# Patient Record
Sex: FEMALE | Race: WHITE | NOT HISPANIC OR LATINO
[De-identification: names, ages, dates, MRNs, and addresses within clinical notes are randomized per-mention and may not be internally consistent; named-entity substitution may affect disease eponyms.]

---

## 2018-01-31 ENCOUNTER — APPOINTMENT (OUTPATIENT)
Dept: PULMONOLOGY | Facility: CLINIC | Age: 38
End: 2018-01-31
Payer: COMMERCIAL

## 2018-01-31 VITALS
SYSTOLIC BLOOD PRESSURE: 120 MMHG | HEART RATE: 113 BPM | WEIGHT: 129 LBS | OXYGEN SATURATION: 98 % | BODY MASS INDEX: 19.55 KG/M2 | RESPIRATION RATE: 16 BRPM | DIASTOLIC BLOOD PRESSURE: 80 MMHG | HEIGHT: 68 IN

## 2018-01-31 DIAGNOSIS — Z80.42 FAMILY HISTORY OF MALIGNANT NEOPLASM OF PROSTATE: ICD-10-CM

## 2018-01-31 DIAGNOSIS — Z78.9 OTHER SPECIFIED HEALTH STATUS: ICD-10-CM

## 2018-01-31 DIAGNOSIS — J32.9 CHRONIC SINUSITIS, UNSPECIFIED: ICD-10-CM

## 2018-01-31 PROCEDURE — 71046 X-RAY EXAM CHEST 2 VIEWS: CPT

## 2018-01-31 PROCEDURE — 94729 DIFFUSING CAPACITY: CPT

## 2018-01-31 PROCEDURE — 99205 OFFICE O/P NEW HI 60 MIN: CPT | Mod: 25

## 2018-01-31 PROCEDURE — 94727 GAS DIL/WSHOT DETER LNG VOL: CPT

## 2018-01-31 PROCEDURE — 94060 EVALUATION OF WHEEZING: CPT

## 2018-01-31 RX ORDER — ALBUTEROL SULFATE 90 UG/1
108 (90 BASE) AEROSOL, METERED RESPIRATORY (INHALATION)
Qty: 8 | Refills: 0 | Status: ACTIVE | COMMUNITY
Start: 2018-01-08

## 2018-01-31 RX ORDER — TINIDAZOLE 500 MG/1
500 TABLET, FILM COATED ORAL
Qty: 8 | Refills: 0 | Status: ACTIVE | COMMUNITY
Start: 2018-01-18

## 2018-01-31 RX ORDER — FLUTICASONE PROPIONATE 50 UG/1
50 SPRAY, METERED NASAL
Qty: 16 | Refills: 0 | Status: ACTIVE | COMMUNITY
Start: 2018-01-02

## 2018-01-31 RX ORDER — BENZONATATE 100 MG/1
100 CAPSULE ORAL
Qty: 21 | Refills: 0 | Status: ACTIVE | COMMUNITY
Start: 2018-01-02

## 2018-01-31 RX ORDER — TERCONAZOLE 8 MG/G
0.8 CREAM VAGINAL
Qty: 20 | Refills: 0 | Status: ACTIVE | COMMUNITY
Start: 2018-01-18

## 2018-01-31 RX ORDER — LEVALBUTEROL HYDROCHLORIDE 0.63 MG/3ML
0.63 SOLUTION RESPIRATORY (INHALATION)
Qty: 120 | Refills: 3 | Status: ACTIVE | COMMUNITY
Start: 2018-01-31 | End: 1900-01-01

## 2018-02-01 ENCOUNTER — LABORATORY RESULT (OUTPATIENT)
Age: 38
End: 2018-02-01

## 2018-02-02 LAB
24R-OH-CALCIDIOL SERPL-MCNC: 72.1 PG/ML
25(OH)D3 SERPL-MCNC: 20 NG/ML
BASOPHILS # BLD AUTO: 0.01 K/UL
BASOPHILS NFR BLD AUTO: 0.1 %
DEPRECATED KAPPA LC FREE/LAMBDA SER: 0.66 RATIO
EOSINOPHIL # BLD AUTO: 0.02 K/UL
EOSINOPHIL NFR BLD AUTO: 0.2 %
HCT VFR BLD CALC: 40.4 %
HGB BLD-MCNC: 13.1 G/DL
IGA SER QL IEP: 201 MG/DL
IGE SER-MCNC: 35 IU/ML
IGG SER QL IEP: 810 MG/DL
IGM SER QL IEP: 84 MG/DL
IMM GRANULOCYTES NFR BLD AUTO: 0.3 %
KAPPA LC CSF-MCNC: 0.94 MG/DL
KAPPA LC SERPL-MCNC: 0.62 MG/DL
LYMPHOCYTES # BLD AUTO: 1.23 K/UL
LYMPHOCYTES NFR BLD AUTO: 14.1 %
MAN DIFF?: NORMAL
MCHC RBC-ENTMCNC: 29.3 PG
MCHC RBC-ENTMCNC: 32.4 GM/DL
MCV RBC AUTO: 90.4 FL
MONOCYTES # BLD AUTO: 0.69 K/UL
MONOCYTES NFR BLD AUTO: 7.9 %
NEUTROPHILS # BLD AUTO: 6.75 K/UL
NEUTROPHILS NFR BLD AUTO: 77.4 %
PLATELET # BLD AUTO: 179 K/UL
RBC # BLD: 4.47 M/UL
RBC # FLD: 12.9 %
WBC # FLD AUTO: 8.73 K/UL

## 2018-02-03 ENCOUNTER — TRANSCRIPTION ENCOUNTER (OUTPATIENT)
Age: 38
End: 2018-02-03

## 2018-02-03 LAB
A ALTERNATA IGE QN: <0.1 KUA/L
A FUMIGATUS IGE QN: <0.1 KUA/L
C ALBICANS IGE QN: <0.1 KUA/L
C HERBARUM IGE QN: <0.1 KUA/L
CAT DANDER IGE QN: <0.1 KUA/L
CLAM IGE QN: <0.1 KUA/L
CODFISH IGE QN: <0.1 KUA/L
COMMON RAGWEED IGE QN: <0.1 KUA/L
CORN IGE QN: <0.1 KUA/L
COW MILK IGE QN: <0.1 KUA/L
D FARINAE IGE QN: 9.67 KUA/L
D PTERONYSS IGE QN: 6.4 KUA/L
DEPRECATED A ALTERNATA IGE RAST QL: 0
DEPRECATED A FUMIGATUS IGE RAST QL: 0
DEPRECATED C ALBICANS IGE RAST QL: 0
DEPRECATED C HERBARUM IGE RAST QL: 0
DEPRECATED CAT DANDER IGE RAST QL: 0
DEPRECATED CLAM IGE RAST QL: 0
DEPRECATED CODFISH IGE RAST QL: 0
DEPRECATED COMMON RAGWEED IGE RAST QL: 0
DEPRECATED CORN IGE RAST QL: 0
DEPRECATED COW MILK IGE RAST QL: 0
DEPRECATED D FARINAE IGE RAST QL: ABNORMAL
DEPRECATED D PTERONYSS IGE RAST QL: ABNORMAL
DEPRECATED DOG DANDER IGE RAST QL: 0
DEPRECATED EGG WHITE IGE RAST QL: 0
DEPRECATED M RACEMOSUS IGE RAST QL: 0
DEPRECATED PEANUT IGE RAST QL: 0
DEPRECATED ROACH IGE RAST QL: 0
DEPRECATED SCALLOP IGE RAST QL: <0.1 KUA/L
DEPRECATED SESAME SEED IGE RAST QL: 0
DEPRECATED SHRIMP IGE RAST QL: 0
DEPRECATED SOYBEAN IGE RAST QL: 0
DEPRECATED TIMOTHY IGE RAST QL: 0
DEPRECATED WALNUT IGE RAST QL: 0
DEPRECATED WHEAT IGE RAST QL: 0
DEPRECATED WHITE OAK IGE RAST QL: 0
DOG DANDER IGE QN: <0.1 KUA/L
EGG WHITE IGE QN: <0.1 KUA/L
M RACEMOSUS IGE QN: <0.1 KUA/L
PEANUT IGE QN: <0.1 KUA/L
ROACH IGE QN: <0.1 KUA/L
SCALLOP IGE QN: 0
SCALLOP IGE QN: <0.1 KUA/L
SESAME SEED IGE QN: <0.1 KUA/L
SOYBEAN IGE QN: <0.1 KUA/L
TIMOTHY IGE QN: <0.1 KUA/L
WALNUT IGE QN: <0.1 KUA/L
WHEAT IGE QN: <0.1 KUA/L
WHITE OAK IGE QN: <0.1 KUA/L

## 2018-02-06 LAB
B PERT IGG SER-ACNC: <0.95 INDEX
B PERT IGM SER-ACNC: <1 INDEX
CHLAMYDIA PNEUMONIAE AB IGA: NORMAL TITER
CHLAMYDIA PNEUMONIAE AB IGG: NORMAL TITER
CHLAMYDIA PNEUMONIAE AB IGM: NORMAL TITER
DEPRECATED S PNEUM 1 IGG SER-MCNC: 4.7 MCG/ML
DEPRECATED S PNEUM12 AB SER-ACNC: 0.2 MCG/ML
DEPRECATED S PNEUM14 AB SER-ACNC: 2.4 MCG/ML
DEPRECATED S PNEUM17 IGG SER IA-MCNC: 4.3 MCG/ML
DEPRECATED S PNEUM18 IGG SER IA-MCNC: 0.6 MCG/ML
DEPRECATED S PNEUM19 IGG SER-MCNC: 1.7 MCG/ML
DEPRECATED S PNEUM19 IGG SER-MCNC: 7 MCG/ML
DEPRECATED S PNEUM2 IGG SER-MCNC: 1.1 MCG/ML
DEPRECATED S PNEUM20 IGG SER-MCNC: 1.3 MCG/ML
DEPRECATED S PNEUM22 IGG SER-MCNC: 17.6 MCG/ML
DEPRECATED S PNEUM23 AB SER-ACNC: 20 MCG/ML
DEPRECATED S PNEUM3 AB SER-ACNC: 1 MCG/ML
DEPRECATED S PNEUM34 IGG SER-MCNC: 2.2 MCG/ML
DEPRECATED S PNEUM4 AB SER-ACNC: 0.3 MCG/ML
DEPRECATED S PNEUM5 IGG SER-MCNC: 1.1 MCG/ML
DEPRECATED S PNEUM6 IGG SER-MCNC: 1.8 MCG/ML
DEPRECATED S PNEUM7 IGG SER-ACNC: 4.4 MCG/ML
DEPRECATED S PNEUM8 AB SER-ACNC: 0.6 MCG/ML
DEPRECATED S PNEUM9 AB SER-ACNC: 1.6 MCG/ML
DEPRECATED S PNEUM9 IGG SER-MCNC: 1.4 MCG/ML
IGG SUBSET TOTAL IGG: 783 MG/DL
IGG1 SER-MCNC: 384 MG/DL
IGG2 SER-MCNC: 305 MG/DL
IGG3 SER-MCNC: 24 MG/DL
IGG4 SER-MCNC: 18 MG/DL
M PNEUMO IGM SER QL IA: 186 UNITS/ML
MYCOPLASMA AG SPEC QL: NEGATIVE
STREPTOCOCCUS PNEUMONIAE SEROTYPE 11A: 0.7 MCG/ML
STREPTOCOCCUS PNEUMONIAE SEROTYPE 15B: 4 MCG/ML
STREPTOCOCCUS PNEUMONIAE SEROTYPE 33F: 0.9 MCG/ML

## 2018-02-06 RX ORDER — ERGOCALCIFEROL 1.25 MG/1
1.25 MG CAPSULE, LIQUID FILLED ORAL
Qty: 6 | Refills: 1 | Status: ACTIVE | COMMUNITY
Start: 2018-02-06 | End: 1900-01-01

## 2018-02-07 ENCOUNTER — FORM ENCOUNTER (OUTPATIENT)
Age: 38
End: 2018-02-07

## 2018-02-07 LAB
M PNEUMO IGG SER IA-ACNC: POSITIVE
M PNEUMO IGG SER QL IA: 1.11 INDEX

## 2018-02-08 ENCOUNTER — APPOINTMENT (OUTPATIENT)
Dept: CT IMAGING | Facility: CLINIC | Age: 38
End: 2018-02-08
Payer: COMMERCIAL

## 2018-02-08 ENCOUNTER — OUTPATIENT (OUTPATIENT)
Dept: OUTPATIENT SERVICES | Facility: HOSPITAL | Age: 38
LOS: 1 days | End: 2018-02-08
Payer: COMMERCIAL

## 2018-02-08 DIAGNOSIS — J39.8 OTHER SPECIFIED DISEASES OF UPPER RESPIRATORY TRACT: ICD-10-CM

## 2018-02-08 PROCEDURE — 71250 CT THORAX DX C-: CPT | Mod: 26

## 2018-02-08 PROCEDURE — 71250 CT THORAX DX C-: CPT

## 2018-02-13 RX ORDER — METHYLPREDNISOLONE 4 MG/1
4 TABLET ORAL
Qty: 21 | Refills: 0 | Status: DISCONTINUED | COMMUNITY
Start: 2018-01-08 | End: 2018-02-13

## 2018-02-13 RX ORDER — ERGOCALCIFEROL 1.25 MG/1
1.25 MG CAPSULE, LIQUID FILLED ORAL
Qty: 6 | Refills: 1 | Status: ACTIVE | COMMUNITY
Start: 2018-02-13 | End: 1900-01-01

## 2018-02-13 RX ORDER — CICLESONIDE 37 UG/1
37 AEROSOL, METERED NASAL TWICE DAILY
Qty: 3 | Refills: 1 | Status: ACTIVE | COMMUNITY
Start: 2018-02-13 | End: 1900-01-01

## 2018-02-13 RX ORDER — DESLORATADINE 5 MG/1
5 TABLET ORAL DAILY
Qty: 90 | Refills: 1 | Status: ACTIVE | COMMUNITY
Start: 2018-02-13 | End: 1900-01-01

## 2018-03-20 ENCOUNTER — APPOINTMENT (OUTPATIENT)
Dept: PULMONOLOGY | Facility: CLINIC | Age: 38
End: 2018-03-20

## 2018-03-23 ENCOUNTER — APPOINTMENT (OUTPATIENT)
Dept: PULMONOLOGY | Facility: CLINIC | Age: 38
End: 2018-03-23
Payer: COMMERCIAL

## 2018-03-23 VITALS
HEIGHT: 68 IN | HEART RATE: 82 BPM | DIASTOLIC BLOOD PRESSURE: 84 MMHG | WEIGHT: 129 LBS | OXYGEN SATURATION: 99 % | SYSTOLIC BLOOD PRESSURE: 108 MMHG | RESPIRATION RATE: 17 BRPM | BODY MASS INDEX: 19.55 KG/M2

## 2018-03-23 DIAGNOSIS — J45.909 UNSPECIFIED ASTHMA, UNCOMPLICATED: ICD-10-CM

## 2018-03-23 PROCEDURE — 99214 OFFICE O/P EST MOD 30 MIN: CPT | Mod: 25

## 2018-03-23 PROCEDURE — 94010 BREATHING CAPACITY TEST: CPT

## 2018-03-23 RX ORDER — FLUCONAZOLE 150 MG/1
150 TABLET ORAL
Qty: 1 | Refills: 0 | Status: DISCONTINUED | COMMUNITY
Start: 2018-02-20

## 2018-03-23 RX ORDER — OLOPATADINE HYDROCHLORIDE 665 UG/1
0.6 SPRAY, METERED NASAL
Qty: 3 | Refills: 1 | Status: DISCONTINUED | COMMUNITY
Start: 2018-01-31 | End: 2018-03-23

## 2018-03-23 RX ORDER — HYDROCODONE BITARTRATE AND HOMATROPINE METHYLBROMIDE 5; 1.5 MG/5ML; MG/5ML
5-1.5 SYRUP ORAL
Qty: 120 | Refills: 0 | Status: DISCONTINUED | COMMUNITY
Start: 2018-01-08 | End: 2018-03-23

## 2018-03-23 RX ORDER — TERCONAZOLE 4 MG/G
0.4 CREAM VAGINAL
Qty: 45 | Refills: 0 | Status: DISCONTINUED | COMMUNITY
Start: 2018-02-22

## 2018-03-23 RX ORDER — LEVOFLOXACIN 500 MG/1
500 TABLET, FILM COATED ORAL
Qty: 10 | Refills: 0 | Status: DISCONTINUED | COMMUNITY
Start: 2018-01-02 | End: 2018-03-23

## 2018-03-23 RX ORDER — RANITIDINE 300 MG/1
300 TABLET ORAL
Qty: 90 | Refills: 1 | Status: ACTIVE | COMMUNITY
Start: 2018-03-23 | End: 1900-01-01

## 2018-03-23 RX ORDER — PREDNISONE 10 MG/1
10 TABLET ORAL
Qty: 100 | Refills: 0 | Status: DISCONTINUED | COMMUNITY
Start: 2018-01-31 | End: 2018-03-23

## 2018-03-23 RX ORDER — AZITHROMYCIN 500 MG/1
500 TABLET, FILM COATED ORAL DAILY
Qty: 7 | Refills: 0 | Status: DISCONTINUED | COMMUNITY
Start: 2018-01-31 | End: 2018-03-23

## 2018-04-11 ENCOUNTER — RESULT REVIEW (OUTPATIENT)
Age: 38
End: 2018-04-11

## 2018-05-14 ENCOUNTER — MEDICATION RENEWAL (OUTPATIENT)
Age: 38
End: 2018-05-14

## 2018-06-06 ENCOUNTER — MEDICATION RENEWAL (OUTPATIENT)
Age: 38
End: 2018-06-06

## 2018-06-22 ENCOUNTER — APPOINTMENT (OUTPATIENT)
Dept: PULMONOLOGY | Facility: CLINIC | Age: 38
End: 2018-06-22
Payer: COMMERCIAL

## 2018-06-22 VITALS
DIASTOLIC BLOOD PRESSURE: 60 MMHG | RESPIRATION RATE: 14 BRPM | HEIGHT: 67 IN | HEART RATE: 88 BPM | SYSTOLIC BLOOD PRESSURE: 110 MMHG | OXYGEN SATURATION: 99 % | BODY MASS INDEX: 22.13 KG/M2 | WEIGHT: 141 LBS

## 2018-06-22 PROCEDURE — 90670 PCV13 VACCINE IM: CPT

## 2018-06-22 PROCEDURE — 94010 BREATHING CAPACITY TEST: CPT

## 2018-06-22 PROCEDURE — 99214 OFFICE O/P EST MOD 30 MIN: CPT | Mod: 25

## 2018-06-22 PROCEDURE — G0009: CPT

## 2018-06-22 RX ORDER — AZELASTINE HYDROCHLORIDE AND FLUTICASONE PROPIONATE 137; 50 UG/1; UG/1
137-50 SPRAY, METERED NASAL
Qty: 23 | Refills: 0 | Status: ACTIVE | COMMUNITY
Start: 2018-03-30

## 2018-06-22 RX ORDER — CLINDAMYCIN PHOSPHATE 20 MG/G
2 CREAM VAGINAL
Qty: 40 | Refills: 0 | Status: DISCONTINUED | COMMUNITY
Start: 2018-04-16

## 2018-06-22 RX ORDER — PYRIDOXINE HCL (VITAMIN B6) 25 MG
1.25 MG LOZENGE ON A HANDLE MUCOUS MEMBRANE
Qty: 4 | Refills: 0 | Status: ACTIVE | COMMUNITY
Start: 2018-04-06

## 2018-06-25 ENCOUNTER — MEDICATION RENEWAL (OUTPATIENT)
Age: 38
End: 2018-06-25

## 2018-06-25 RX ORDER — ACLIDINIUM BROMIDE 400 UG/1
400 POWDER, METERED RESPIRATORY (INHALATION) TWICE DAILY
Qty: 3 | Refills: 2 | Status: DISCONTINUED | COMMUNITY
Start: 2018-06-22 | End: 2018-06-25

## 2018-06-25 RX ORDER — UMECLIDINIUM 62.5 UG/1
62.5 AEROSOL, POWDER ORAL
Qty: 3 | Refills: 1 | Status: ACTIVE | COMMUNITY
Start: 2018-06-25 | End: 1900-01-01

## 2018-09-22 ENCOUNTER — RX RENEWAL (OUTPATIENT)
Age: 38
End: 2018-09-22

## 2018-09-22 RX ORDER — FLUTICASONE PROPIONATE AND SALMETEROL 50; 250 UG/1; UG/1
250-50 POWDER RESPIRATORY (INHALATION)
Qty: 3 | Refills: 1 | Status: ACTIVE | COMMUNITY
Start: 2018-01-31 | End: 1900-01-01

## 2018-09-22 RX ORDER — AZELASTINE HYDROCHLORIDE 205.5 UG/1
0.15 SPRAY, METERED NASAL TWICE DAILY
Qty: 3 | Refills: 1 | Status: ACTIVE | COMMUNITY
Start: 2018-03-23 | End: 1900-01-01

## 2018-09-26 ENCOUNTER — RX RENEWAL (OUTPATIENT)
Age: 38
End: 2018-09-26

## 2018-09-26 RX ORDER — FLUTICASONE PROPIONATE AND SALMETEROL 50; 250 UG/1; UG/1
250-50 POWDER RESPIRATORY (INHALATION)
Qty: 3 | Refills: 1 | Status: ACTIVE | COMMUNITY
Start: 2018-09-26 | End: 1900-01-01

## 2018-10-22 ENCOUNTER — APPOINTMENT (OUTPATIENT)
Dept: PULMONOLOGY | Facility: CLINIC | Age: 38
End: 2018-10-22
Payer: COMMERCIAL

## 2018-10-22 ENCOUNTER — NON-APPOINTMENT (OUTPATIENT)
Age: 38
End: 2018-10-22

## 2018-10-22 VITALS
DIASTOLIC BLOOD PRESSURE: 82 MMHG | HEIGHT: 67 IN | HEART RATE: 93 BPM | BODY MASS INDEX: 21.35 KG/M2 | SYSTOLIC BLOOD PRESSURE: 120 MMHG | OXYGEN SATURATION: 98 % | WEIGHT: 136 LBS | RESPIRATION RATE: 17 BRPM

## 2018-10-22 DIAGNOSIS — J45.909 UNSPECIFIED ASTHMA, UNCOMPLICATED: ICD-10-CM

## 2018-10-22 DIAGNOSIS — R79.89 OTHER SPECIFIED ABNORMAL FINDINGS OF BLOOD CHEMISTRY: ICD-10-CM

## 2018-10-22 DIAGNOSIS — Z23 ENCOUNTER FOR IMMUNIZATION: ICD-10-CM

## 2018-10-22 DIAGNOSIS — K21.9 GASTRO-ESOPHAGEAL REFLUX DISEASE W/OUT ESOPHAGITIS: ICD-10-CM

## 2018-10-22 DIAGNOSIS — R06.02 SHORTNESS OF BREATH: ICD-10-CM

## 2018-10-22 DIAGNOSIS — D84.9 IMMUNODEFICIENCY, UNSPECIFIED: ICD-10-CM

## 2018-10-22 DIAGNOSIS — J32.9 CHRONIC SINUSITIS, UNSPECIFIED: ICD-10-CM

## 2018-10-22 DIAGNOSIS — Z72.820 SLEEP DEPRIVATION: ICD-10-CM

## 2018-10-22 DIAGNOSIS — R05 COUGH: ICD-10-CM

## 2018-10-22 PROCEDURE — 99214 OFFICE O/P EST MOD 30 MIN: CPT | Mod: 25

## 2018-10-22 PROCEDURE — G0009: CPT

## 2018-10-22 PROCEDURE — 90732 PPSV23 VACC 2 YRS+ SUBQ/IM: CPT

## 2018-10-22 PROCEDURE — 90472 IMMUNIZATION ADMIN EACH ADD: CPT

## 2018-10-22 PROCEDURE — G0008: CPT

## 2018-10-22 PROCEDURE — 90686 IIV4 VACC NO PRSV 0.5 ML IM: CPT

## 2018-10-22 PROCEDURE — 94010 BREATHING CAPACITY TEST: CPT

## 2018-10-22 RX ORDER — DESLORATADINE 5 MG/1
5 TABLET ORAL
Qty: 90 | Refills: 1 | Status: ACTIVE | COMMUNITY
Start: 2018-10-22 | End: 1900-01-01

## 2018-10-22 RX ORDER — ALBUTEROL SULFATE 2.5 MG/3ML
(2.5 MG/3ML) SOLUTION RESPIRATORY (INHALATION)
Qty: 120 | Refills: 3 | Status: ACTIVE | COMMUNITY
Start: 2018-10-22 | End: 1900-01-01

## 2018-10-22 NOTE — HISTORY OF PRESENT ILLNESS
[FreeTextEntry1] : Ms. Viera is a 38 year old female presenting to the office for a follow up visit for asthma, chronic cough, GERD, chronic sinusitis, low vitamin D, and shortness of breath. Her chief complaint is \par - She comes in stating that her medications had not been working as she recently became sick. It began first with a sore throat. She followed up with her PCP, who recommended she followup with an ENT. Her symptoms progressed to cough and, as she describes, a sinusitis. She states that her symptoms were very similar to those she experiences in January.\par - She describes her symptoms as beginning in her head and traveling to her chest.  \par - She states that she was coughing so severely the previously night that she vomited. \par - She is currently improving. \par - She believes that her occupational stressors are contributing to her frequent illnesses.\par - She does not sleep well when she has her cough as she feels her sputum drop into her chest while supine.

## 2018-10-22 NOTE — PROCEDURE
[FreeTextEntry1] : PFT- spi reveals normal flows; FEV1 was 3.43L which is 102% of predicted; normal flow volume loop

## 2018-10-22 NOTE — ASSESSMENT
[FreeTextEntry1] : Ms. Viera is a 38 year old female with a history of asthma, sinus disease, GERD, allergy, and poor immunity, who now comes in for re-evaluation for shortness of breath and cough. \par \par Her cough is felt to be multifactorial due to:\par -asthma\par -post nasal drip syndrome\par - Sensory Neuropathic Cough\par \par problem 1: asthmatic bronchitis\par -continue nebulizer to be used with Xopenex 0.63% QID, prn (on the shelf currently)\par -continue Advair 250 at 1 inhalation BID- She is being prescribed the generic\par - Add Tudorza 1 puff BID\par - Add Ventolin before exercise  after Advair\par -Asthma is  believed to be caused by inherited (genetic) and environmental factor, but its exact cause is unknown. Asthma may be triggered by allergens, lung infections, or irritants in the air. Asthma triggers are different for each person\par -Inhaler technique reviewed as well as oral hygiene techniques reviewed with patient. Avoidance of cold air, extremes of temperature, rescue inhaler should be used before exercise. Order of medication reviewed with patient. Recommended use of a cool mist humidifier in the bedroom.\par \par problem 2: sensory neuropathic cough \par DDx include: pertussis, chlamydiae pneumoniae, mycoplasma pneumoniae-all negative\par -add Amitriptyline 10 mg up to TID \par -Sensory neuropathic cough is an etiology of cough that is often realized once someone has been ruled out for common diseases such as asthma, COPD, eosinophilic bronchitis, bronchiectasis, post-nasal drip, and GERD. It sometimes develops following a URI, herpes zoster outbreak in pharynx, or thyroid or cervical spine injury. However, many patients have no identifiable antecedent explanation. \par \par problem 3: chronic sinus disease\par - Recommended the Navage\par -recommended Sinugator nasal rinse\par - Continue Astelin 0.15% 1 inhalation each nostril BID \par -continue Flonase 1 sniff/nostril BID\par -Environmental measures for allergies were encouraged including mattress and pillow cover, air purifier, and environmental controls.\par \par Problem 4: ?YOLANDA\par - Conducting a home sleep study to discern whether YOLANDA is present.\par Sleep apnea is associated with adverse clinical consequences which an affect most organ systems. Cardiovascular disease risk includes arrhythmias, atrial fibrillation, hypertension, coronary artery disease, and stroke. Metabolic disorders include diabetes type 2, non-alcoholic fatty liver disease. Mood disorder especially depression; and cognitive decline especially in the elderly. Associations with chronic reflux/Loya’s esophagus some but not all inclusive. \par -Reasons include arousal consistent with hypopnea; respiratory events most prominent in REM sleep or supine position; therefore sleep staging and body position are important for accurate diagnosis and estimation of AHI. \par \par  \par problem 5: r/o allergic profile\par - She is s/p blood work, which revealed: food IgE level, asthma panel, IgE level, eosinophil level revealed all normal and vitamin D level returned low, with significant dust allergy\par -Environmental measures for allergies were encouraged including mattress and pillow cover, air purifier, and environmental controls. \par \par problem 6: r/o immunodeficiency \par -blood work revealed low strep pneumoniae titers\par - She is s/p Prevnar 13 (6/17/2018)\par - She is due to have her Pneumovax\par -Due to the fact that this pt has had more infections than would be expected and immunological blood work is indicated this would include: IgG subclasses, quantitative immunoglobulins, Strep pneumoniae titers as well as Vitamin D levels. Based on this blood work we will be able to decide where the pt needs additional pneumococcal vaccine either Prevnar 13 or pneumovax. Immunology evaluation will also be potentially indicated.\par \par problem 7: low vitamin D\par -recommended to take Vitamin D supplements 50,000 units twice monthly\par -Low vitamin D Has been associated with asthma exacerbations and increased allergic symptoms. The goal based on recent information is maintaining levels between 50-70 and low normal is 30. Recommended 50,000 units every two weeks to once a month depending on the level. \par \par problem 8: GERD\par -add Zantac 300 mg QHS\par -Things to avoid including overeating, spicy foods, tight clothing, eating within three hours of bed, this list is not all inclusive. \par -For treatment of reflux, possible options discussed including diet control, H2 blockers, PPIs, as well as coating motility agents discussed as treatment options. Timing of meals and proximity of last meal to sleep were discussed. If symptoms persist, a formal gastrointestinal evaluation is needed.\par -Rule of 2's: Avoid eating too late, too fast, too much, too spicy or within two hours of bedtime \par \par problem 9: health maintenance \par -recommended yearly flu shot after October 15\par -recommended strep pneumonia vaccines: Prevnar-13 vaccine, followed by Pneumo vaccine 23 (2018) one year following\par -recommended early intervention for URIs\par -recommended regular osteoporosis evaluations\par -recommended early dermatological evaluations\par -recommended after the age of 50 to the age of 70, colonoscopy every 5 years \par \par \par F/U in 3 months\par She is encouraged to call with any changes, concerns, or questions

## 2018-10-22 NOTE — ADDENDUM
[FreeTextEntry1] : Documented by Zach Guallpa acting as a scribe for Dr. Abhay Mcclendon on 10/22/18\par \par All medical record entries made by the Scribe were at my, Dr. Abhay Mcclendon's, direction and personally dictated by me on 10/22/18. I have reviewed the chart and agree that the record accurately reflects my personal performance of the history, physical exam, assessment and plan. I have also personally directed, reviewed, and agree with the discharge instructions. \par \par \par \par \par

## 2018-10-22 NOTE — REASON FOR VISIT
[Follow-Up] : a follow-up visit [FreeTextEntry1] : asthmatic bronchitis history, chronic cough, chronic sinusitis, low vitamin D, shortness of breath and frequent illnesses

## 2018-11-18 ENCOUNTER — RX RENEWAL (OUTPATIENT)
Age: 38
End: 2018-11-18

## 2018-11-18 RX ORDER — ERGOCALCIFEROL 1.25 MG/1
1.25 MG CAPSULE, LIQUID FILLED ORAL
Qty: 6 | Refills: 3 | Status: ACTIVE | COMMUNITY
Start: 2018-06-22 | End: 1900-01-01

## 2018-12-06 ENCOUNTER — RX CHANGE (OUTPATIENT)
Age: 38
End: 2018-12-06

## 2018-12-06 RX ORDER — FLUTICASONE PROPIONATE AND SALMETEROL 232; 14 UG/1; UG/1
232-14 POWDER, METERED RESPIRATORY (INHALATION)
Qty: 3 | Refills: 1 | Status: DISCONTINUED | COMMUNITY
Start: 2018-10-22 | End: 2018-12-06

## 2018-12-11 ENCOUNTER — RX CHANGE (OUTPATIENT)
Age: 38
End: 2018-12-11

## 2018-12-11 RX ORDER — MOMETASONE FUROATE AND FORMOTEROL FUMARATE DIHYDRATE 200; 5 UG/1; UG/1
200-5 AEROSOL RESPIRATORY (INHALATION) TWICE DAILY
Qty: 1 | Refills: 0 | Status: DISCONTINUED | COMMUNITY
Start: 2018-12-06 | End: 2018-12-11

## 2018-12-11 RX ORDER — BUDESONIDE AND FORMOTEROL FUMARATE DIHYDRATE 160; 4.5 UG/1; UG/1
160-4.5 AEROSOL RESPIRATORY (INHALATION) TWICE DAILY
Qty: 1 | Refills: 3 | Status: ACTIVE | COMMUNITY
Start: 2018-12-11 | End: 1900-01-01

## 2018-12-13 ENCOUNTER — RX RENEWAL (OUTPATIENT)
Age: 38
End: 2018-12-13

## 2018-12-13 RX ORDER — AMITRIPTYLINE HYDROCHLORIDE 10 MG/1
10 TABLET, FILM COATED ORAL
Qty: 90 | Refills: 3 | Status: ACTIVE | COMMUNITY
Start: 2018-02-06 | End: 1900-01-01

## 2019-02-26 ENCOUNTER — APPOINTMENT (OUTPATIENT)
Dept: PULMONOLOGY | Facility: CLINIC | Age: 39
End: 2019-02-26

## 2019-12-14 ENCOUNTER — RESULT REVIEW (OUTPATIENT)
Age: 39
End: 2019-12-14

## 2020-09-28 ENCOUNTER — RESULT REVIEW (OUTPATIENT)
Age: 40
End: 2020-09-28

## 2020-11-16 ENCOUNTER — TRANSCRIPTION ENCOUNTER (OUTPATIENT)
Age: 40
End: 2020-11-16

## 2021-01-18 ENCOUNTER — RESULT REVIEW (OUTPATIENT)
Age: 41
End: 2021-01-18

## 2021-02-02 ENCOUNTER — TRANSCRIPTION ENCOUNTER (OUTPATIENT)
Age: 41
End: 2021-02-02

## 2021-03-16 ENCOUNTER — TRANSCRIPTION ENCOUNTER (OUTPATIENT)
Age: 41
End: 2021-03-16

## 2021-07-10 ENCOUNTER — TRANSCRIPTION ENCOUNTER (OUTPATIENT)
Age: 41
End: 2021-07-10

## 2021-08-16 ENCOUNTER — RESULT REVIEW (OUTPATIENT)
Age: 41
End: 2021-08-16

## 2021-10-16 ENCOUNTER — RESULT REVIEW (OUTPATIENT)
Age: 41
End: 2021-10-16

## 2022-01-01 ENCOUNTER — TRANSCRIPTION ENCOUNTER (OUTPATIENT)
Age: 42
End: 2022-01-01

## 2022-04-04 ENCOUNTER — RESULT REVIEW (OUTPATIENT)
Age: 42
End: 2022-04-04

## 2023-05-29 ENCOUNTER — NON-APPOINTMENT (OUTPATIENT)
Age: 43
End: 2023-05-29

## 2023-07-17 ENCOUNTER — OFFICE (OUTPATIENT)
Dept: URBAN - METROPOLITAN AREA CLINIC 109 | Facility: CLINIC | Age: 43
Setting detail: OPHTHALMOLOGY
End: 2023-07-17
Payer: COMMERCIAL

## 2023-07-17 DIAGNOSIS — H10.45: ICD-10-CM

## 2023-07-17 PROCEDURE — 92014 COMPRE OPH EXAM EST PT 1/>: CPT | Performed by: OPHTHALMOLOGY

## 2023-07-17 ASSESSMENT — REFRACTION_CURRENTRX
OS_CYLINDER: -2.75
OD_SPHERE: -1.50
OS_OVR_VA: 20/
OD_AXIS: 178
OS_SPHERE: +2.75
OD_OVR_VA: 20/
OD_CYLINDER: -1.50
OS_AXIS: 177
OD_AXIS: 179
OS_SPHERE: +2.75
OS_AXIS: 177
OS_CYLINDER: -2.50
OD_OVR_VA: 20/
OS_OVR_VA: 20/
OD_CYLINDER: -1.50
OD_SPHERE: +1.50

## 2023-07-17 ASSESSMENT — TONOMETRY
OD_IOP_MMHG: 16
OS_IOP_MMHG: 14

## 2023-07-17 ASSESSMENT — CONFRONTATIONAL VISUAL FIELD TEST (CVF)
OS_FINDINGS: FULL
OD_FINDINGS: FULL

## 2023-07-17 ASSESSMENT — REFRACTION_AUTOREFRACTION
OD_AXIS: 180
OS_CYLINDER: -2.50
OD_CYLINDER: -1.50
OS_AXIS: 178
OD_SPHERE: +1.25
OS_SPHERE: +2.25

## 2023-07-17 ASSESSMENT — SPHEQUIV_DERIVED
OD_SPHEQUIV: 0.5
OS_SPHEQUIV: 1
OS_SPHEQUIV: 1

## 2023-07-17 ASSESSMENT — REFRACTION_MANIFEST
OS_CYLINDER: -2.50
OS_AXIS: 180
OS_SPHERE: +2.25
OS_VA1: 20/20

## 2023-07-17 ASSESSMENT — VISUAL ACUITY
OS_BCVA: 20/20-2
OD_BCVA: 20/25-2

## 2023-07-31 ENCOUNTER — OFFICE (OUTPATIENT)
Dept: URBAN - METROPOLITAN AREA CLINIC 109 | Facility: CLINIC | Age: 43
Setting detail: OPHTHALMOLOGY
End: 2023-07-31
Payer: COMMERCIAL

## 2023-07-31 DIAGNOSIS — H52.13: ICD-10-CM

## 2023-07-31 PROBLEM — H10.45 ALLERGIC CONJUNCTIVITIS ; BOTH EYES: Status: RESOLVED | Noted: 2023-07-17 | Resolved: 2023-07-31

## 2023-07-31 PROCEDURE — SCREF LASIK EVAL: Performed by: OPHTHALMOLOGY

## 2023-07-31 ASSESSMENT — SPHEQUIV_DERIVED
OS_SPHEQUIV: 1.25
OS_SPHEQUIV: 1
OD_SPHEQUIV: 1
OS_SPHEQUIV: 1.25
OD_SPHEQUIV: 0.75

## 2023-07-31 ASSESSMENT — REFRACTION_MANIFEST
OS_VA1: 20/20-
OS_SPHERE: +2.25
OS_CYLINDER: -2.50
OD_CYLINDER: -1.50
OS_SPHERE: +2.50
OS_VA1: 20/20
OD_SPHERE: +1.50
OD_AXIS: 180
OD_VA1: 20/20
OS_AXIS: 180
OS_AXIS: 180
OS_CYLINDER: -2.50

## 2023-07-31 ASSESSMENT — REFRACTION_AUTOREFRACTION
OS_AXIS: 179
OS_SPHERE: +2.50
OD_CYLINDER: -1.50
OS_CYLINDER: -2.50
OD_SPHERE: +1.75
OD_AXIS: 177

## 2023-07-31 ASSESSMENT — REFRACTION_CURRENTRX
OS_OVR_VA: 20/
OD_OVR_VA: 20/
OD_AXIS: 179
OD_CYLINDER: -1.50
OS_AXIS: 177
OS_CYLINDER: -2.75
OD_SPHERE: +1.50
OS_OVR_VA: 20/
OD_CYLINDER: -1.50
OS_SPHERE: +2.75
OD_OVR_VA: 20/
OD_AXIS: 178
OS_AXIS: 177
OD_SPHERE: +1.50
OS_SPHERE: +2.75
OS_CYLINDER: -2.50

## 2023-07-31 ASSESSMENT — VISUAL ACUITY
OD_BCVA: 20/25-2
OS_BCVA: 20/20-2

## 2023-07-31 ASSESSMENT — CONFRONTATIONAL VISUAL FIELD TEST (CVF)
OD_FINDINGS: FULL
OS_FINDINGS: FULL

## 2023-07-31 ASSESSMENT — PACHYMETRY
OS_CT_CORRECTION: -2
OD_CT_CORRECTION: -1
OS_CT_UM: 570
OD_CT_UM: 564

## 2023-07-31 ASSESSMENT — TONOMETRY
OS_IOP_MMHG: 15
OD_IOP_MMHG: 15

## 2023-09-11 ENCOUNTER — OFFICE (OUTPATIENT)
Dept: URBAN - METROPOLITAN AREA CLINIC 109 | Facility: CLINIC | Age: 43
Setting detail: OPHTHALMOLOGY
End: 2023-09-11

## 2023-09-11 DIAGNOSIS — T15.11XA: ICD-10-CM

## 2023-09-11 PROCEDURE — 99213 OFFICE O/P EST LOW 20 MIN: CPT | Performed by: OPHTHALMOLOGY

## 2023-09-11 ASSESSMENT — CONFRONTATIONAL VISUAL FIELD TEST (CVF)
OS_FINDINGS: FULL
OD_FINDINGS: FULL

## 2023-09-11 ASSESSMENT — SPHEQUIV_DERIVED
OS_SPHEQUIV: 1.25
OD_SPHEQUIV: 0.75
OS_SPHEQUIV: 1.25
OD_SPHEQUIV: 1
OS_SPHEQUIV: 1

## 2023-09-11 ASSESSMENT — REFRACTION_MANIFEST
OS_CYLINDER: -2.50
OD_AXIS: 180
OD_SPHERE: +1.50
OS_AXIS: 180
OS_VA1: 20/20-
OS_CYLINDER: -2.50
OD_VA1: 20/20
OS_SPHERE: +2.50
OD_CYLINDER: -1.50
OS_SPHERE: +2.25
OS_AXIS: 180
OS_VA1: 20/20

## 2023-09-11 ASSESSMENT — REFRACTION_CURRENTRX
OD_AXIS: 178
OS_AXIS: 177
OD_SPHERE: +1.50
OS_SPHERE: +2.75
OD_OVR_VA: 20/
OS_OVR_VA: 20/
OS_OVR_VA: 20/
OD_OVR_VA: 20/
OS_CYLINDER: -2.50
OD_SPHERE: +1.50
OD_AXIS: 179
OD_CYLINDER: -1.50
OS_SPHERE: +2.75
OD_CYLINDER: -1.50
OS_AXIS: 177
OS_CYLINDER: -2.75

## 2023-09-11 ASSESSMENT — VISUAL ACUITY
OS_BCVA: 20/30-1
OD_BCVA: 20/50

## 2023-09-11 ASSESSMENT — KERATOMETRY
OD_K2POWER_DIOPTERS: 44.75
OS_K1POWER_DIOPTERS: 42.37
OD_K1POWER_DIOPTERS: 42.62
OS_AXISANGLE_DEGREES: 088
OD_AXISANGLE_DEGREES: 087
OS_K2POWER_DIOPTERS: 45.75

## 2023-09-11 ASSESSMENT — AXIALLENGTH_DERIVED
OS_AL: 23.0113
OS_AL: 22.919
OD_AL: 23.1431
OD_AL: 23.2372
OS_AL: 22.919

## 2023-09-11 ASSESSMENT — REFRACTION_AUTOREFRACTION
OS_AXIS: 179
OD_AXIS: 177
OS_SPHERE: +2.50
OS_CYLINDER: -2.50
OD_SPHERE: +1.75
OD_CYLINDER: -1.50

## 2023-11-23 ENCOUNTER — NON-APPOINTMENT (OUTPATIENT)
Age: 43
End: 2023-11-23

## 2024-07-23 ENCOUNTER — OFFICE (OUTPATIENT)
Dept: URBAN - METROPOLITAN AREA CLINIC 109 | Facility: CLINIC | Age: 44
Setting detail: OPHTHALMOLOGY
End: 2024-07-23
Payer: COMMERCIAL

## 2024-07-23 DIAGNOSIS — H10.45: ICD-10-CM

## 2024-07-23 PROBLEM — H53.002 AMBLYOPIA; LEFT EYE: Status: ACTIVE | Noted: 2024-07-23

## 2024-07-23 PROBLEM — H52.7 REFRACTIVE ERROR: Status: ACTIVE | Noted: 2024-07-23

## 2024-07-23 PROCEDURE — 92014 COMPRE OPH EXAM EST PT 1/>: CPT | Performed by: OPHTHALMOLOGY

## 2024-07-23 ASSESSMENT — CONFRONTATIONAL VISUAL FIELD TEST (CVF)
OS_FINDINGS: FULL
OD_FINDINGS: FULL

## 2024-08-19 ENCOUNTER — NON-APPOINTMENT (OUTPATIENT)
Age: 44
End: 2024-08-19